# Patient Record
Sex: FEMALE | Race: WHITE | NOT HISPANIC OR LATINO | Employment: UNEMPLOYED | URBAN - METROPOLITAN AREA
[De-identification: names, ages, dates, MRNs, and addresses within clinical notes are randomized per-mention and may not be internally consistent; named-entity substitution may affect disease eponyms.]

---

## 2018-01-12 PROBLEM — Z80.0 FAMILY HISTORY OF COLON CANCER: Status: ACTIVE | Noted: 2018-01-12

## 2020-11-11 ENCOUNTER — OFFICE VISIT (OUTPATIENT)
Dept: NEUROLOGY | Facility: CLINIC | Age: 60
End: 2020-11-11
Payer: MEDICAID

## 2020-11-11 VITALS
HEART RATE: 76 BPM | DIASTOLIC BLOOD PRESSURE: 64 MMHG | BODY MASS INDEX: 29.12 KG/M2 | WEIGHT: 174.81 LBS | TEMPERATURE: 98 F | HEIGHT: 65 IN | SYSTOLIC BLOOD PRESSURE: 112 MMHG | RESPIRATION RATE: 16 BRPM

## 2020-11-11 DIAGNOSIS — G40.909 SEIZURE DISORDER: ICD-10-CM

## 2020-11-11 PROCEDURE — 99999 PR PBB SHADOW E&M-EST. PATIENT-LVL III: ICD-10-PCS | Mod: PBBFAC,,, | Performed by: PHYSICIAN ASSISTANT

## 2020-11-11 PROCEDURE — 99204 PR OFFICE/OUTPT VISIT, NEW, LEVL IV, 45-59 MIN: ICD-10-PCS | Mod: S$PBB,,, | Performed by: PHYSICIAN ASSISTANT

## 2020-11-11 PROCEDURE — 99204 OFFICE O/P NEW MOD 45 MIN: CPT | Mod: S$PBB,,, | Performed by: PHYSICIAN ASSISTANT

## 2020-11-11 PROCEDURE — 99213 OFFICE O/P EST LOW 20 MIN: CPT | Mod: PBBFAC | Performed by: PHYSICIAN ASSISTANT

## 2020-11-11 PROCEDURE — 99999 PR PBB SHADOW E&M-EST. PATIENT-LVL III: CPT | Mod: PBBFAC,,, | Performed by: PHYSICIAN ASSISTANT

## 2020-11-11 RX ORDER — LEVETIRACETAM 500 MG/1
TABLET, EXTENDED RELEASE ORAL
Qty: 120 TABLET | Refills: 11 | Status: SHIPPED | OUTPATIENT
Start: 2020-11-11 | End: 2021-10-27

## 2020-11-11 NOTE — PROGRESS NOTES
Subjective:       Patient ID: Ina Adair is a 60 y.o. female.    Chief Complaint: Seizures (6 month follow up)      HPI:  Ina Adair is a 60 y.o. female is here for follow up visit regarding epilepsy. Medications and tolerability were reviewed. Patient states compliance with seizure medications.  No seizures since last visit. She is doing very well with regard to epilepsy. She takes 3 gm of Keppra XR nightly with good tolerability and compliance. She has been seizure free for six years.   Overall, she is doing well. She has kept the weight off and she continues to be tobacco free. Mood is good as well.    She has been on Keppra  mg 4 tabs at bedtime for many years. She has very good compliance and seizure control. She previously had SE with BID dosing, but she is doing fine with XR once daily dosing.    Past Medical History:   Diagnosis Date    Epilepsy     GERD (gastroesophageal reflux disease)     HTN (hypertension)     Onychomycosis     Primary osteoarthritis of right hip 9/10/2015    STD (sexually transmitted disease) 2010    trichomonas    Stress incontinence, female     Tobacco abuse        Past Surgical History:   Procedure Laterality Date    APPENDECTOMY      COLONOSCOPY N/A 1/12/2018    Procedure: COLONOSCOPY;  Surgeon: Yanelis Handy MD;  [facility];  Service: Endoscopy;  Laterality: N/A;    DILATION AND CURETTAGE OF UTERUS      TONSILLECTOMY      [facility]10/2015    [facility]    TUBAL LIGATION         Family History   Problem Relation Age of Onset    Heart disease Mother     Cancer Mother         colon cancer    Hypoglycemic Father     Breast cancer Paternal Aunt     Osteoporosis Sister     Diabetes Brother        Social History     Socioeconomic History    Marital status: Single     Spouse name: Not on file    Number of children: 3    Years of education: GED    Highest education level: Not on file   Occupational  History    Not on file   Social Needs    Financial resource strain: Not on file    Food insecurity     Worry: Not on file     Inability: Not on file    Transportation needs     Medical: Not on file     Non-medical: Not on file   Tobacco Use    Smoking status: Former Smoker     Packs/day: 1.00     Years: 16.00     Pack years: 16.00     Quit date: 1/10/2017     Years since quitting: 3.8    Smokeless tobacco: Never Used   Substance and Sexual Activity    Alcohol use: Yes     Alcohol/week: 1.0 standard drinks     Types: 1 Cans of beer per week     Comment: weekly    Drug use: No    Sexual activity: Yes     Partners: Male     Birth control/protection: Surgical, Post-menopausal, See Surgical Hx   Lifestyle    Physical activity     Days per week: Not on file     Minutes per session: Not on file    Stress: Not on file   Relationships    Social connections     Talks on phone: Not on file     Gets together: Not on file     Attends [employer] service: Not on file     Active member of club or organization: Not on file     Attends meetings of clubs or organizations: Not on file     Relationship status: Not on file   Other Topics Concern    Not on file   Social History Narrative    Not on file       Current Outpatient Medications   Medication Sig Dispense Refill    ARIPiprazole (ABILIFY) 5 MG Tab Take 5 mg by mouth once daily.  0    levetiracetam XR (KEPPRA XR) 500 mg Tb24 24 hr tablet 4 tablets at night for seizures. 120 tablet 11    multivit-minerals-ferrous fum (MULTI VITAMIN) 9 mg iron/15 mL Liqd Multi Vitamin   1 po daily      oxybutynin (DITROPAN-XL) 5 MG TR24 TAKE 1 TABLET BY MOUTH EVERY DAY 30 tablet 11    sertraline (ZOLOFT) 100 MG tablet Take 1 tablet (100 mg total) by mouth once daily. 30 tablet 11              0     No current facility-administered medications for this visit.        Review of patient's allergies indicates:   Allergen Reactions    Ace inhibitors      cough    Amoxicillin Nausea  And Vomiting     diarrhea    Achromycin Rash           Review of Systems   Constitutional: Positive for activity change (increased activity). Negative for appetite change and fever.   HENT: Negative for sore throat.    Eyes: Negative for visual disturbance.   Respiratory: Negative for cough and shortness of breath.    Cardiovascular: Negative for chest pain.   Gastrointestinal: Negative for nausea and vomiting.   Endocrine: Negative for cold intolerance and heat intolerance.   Genitourinary: Negative for difficulty urinating.   Musculoskeletal: Positive for arthralgias (improved) and neck pain (improved). Negative for back pain.   Skin: Negative for rash.   Allergic/Immunologic: Negative for food allergies.   Neurological: Positive for seizures (well controlled). Negative for dizziness, tremors, speech difficulty, weakness, numbness and headaches.   Hematological: Does not bruise/bleed easily.   Psychiatric/Behavioral: Positive for dysphoric mood (improved). Negative for agitation, decreased concentration and sleep disturbance.       Objective:      Neurologic Exam     Cranial Nerves     CN III, IV, VI   Pupils are equal, round, and reactive to light.  Extraocular motions are normal.     Physical Exam  Vitals signs and nursing note reviewed.   Constitutional:       General: She is not in acute distress.     Appearance: She is well-developed. She is not diaphoretic.   HENT:      Head: Normocephalic and atraumatic.   Eyes:      General:         Right eye: No discharge.         Left eye: No discharge.      Extraocular Movements: EOM normal.      Conjunctiva/sclera: Conjunctivae normal.      Pupils: Pupils are equal, round, and reactive to light.   Neck:      Musculoskeletal: Normal range of motion and neck supple.      Thyroid: No thyromegaly.   Cardiovascular:      Rate and Rhythm: Normal rate and regular rhythm.   Pulmonary:      Effort: Pulmonary effort is normal.      Breath sounds: Normal breath sounds.    Abdominal:      General: Bowel sounds are normal.      Palpations: Abdomen is soft.   Musculoskeletal:         General: No tenderness.   Skin:     General: Skin is warm and dry.   Neurological:      Cranial Nerves: No cranial nerve deficit.      Motor: No abnormal muscle tone.      Coordination: Coordination normal.      Deep Tendon Reflexes: Reflexes are normal and symmetric.   Psychiatric:         Behavior: Behavior normal.         Thought Content: Thought content normal.         Judgment: Judgment normal.         Assessment:       1. Seizure disorder        Plan:   Discussed risks and benefits of potential treatment options as well as potential side effects of medications.  Patient was counseled to continue current medications. Seizure precautions were discussed. Specifically discussed driving restrictions per [place] law. Medication compliance discussed. Instructed to call clinic if concerned or to ED if emergency.    Seizure disorder  -     Levetiracetam level; Future; Expected date: 10/14/2019  She will continue Keppra since she has been seizure free for years. If level adequate, no dose changes or timing of dose changes.      MEJIA Fontenot

## 2021-05-06 ENCOUNTER — PATIENT MESSAGE (OUTPATIENT)
Dept: RESEARCH | Facility: HOSPITAL | Age: 61
End: 2021-05-06

## 2021-05-19 DIAGNOSIS — R06.09 DOE (DYSPNEA ON EXERTION): Primary | ICD-10-CM

## 2021-05-19 DIAGNOSIS — Z11.59 SPECIAL SCREENING EXAMINATION FOR UNSPECIFIED VIRAL DISEASE: ICD-10-CM

## 2021-05-23 ENCOUNTER — HOSPITAL ENCOUNTER (OUTPATIENT)
Dept: PREADMISSION TESTING | Facility: HOSPITAL | Age: 61
Discharge: HOME | End: 2021-05-23
Attending: INTERNAL MEDICINE
Payer: MEDICAID

## 2021-05-23 DIAGNOSIS — Z11.59 SPECIAL SCREENING EXAMINATION FOR UNSPECIFIED VIRAL DISEASE: ICD-10-CM

## 2021-05-23 LAB — SARS-COV-2 RNA RESP QL NAA+PROBE: NOT DETECTED

## 2021-05-23 PROCEDURE — U0003 INFECTIOUS AGENT DETECTION BY NUCLEIC ACID (DNA OR RNA); SEVERE ACUTE RESPIRATORY SYNDROME CORONAVIRUS 2 (SARS-COV-2) (CORONAVIRUS DISEASE [COVID-19]), AMPLIFIED PROBE TECHNIQUE, MAKING USE OF HIGH THROUGHPUT TECHNOLOGIES AS DESCRIBED BY CMS-2020-01-R: HCPCS | Performed by: INTERNAL MEDICINE

## 2021-05-23 PROCEDURE — U0005 INFEC AGEN DETEC AMPLI PROBE: HCPCS | Performed by: INTERNAL MEDICINE

## 2021-05-26 ENCOUNTER — HOSPITAL ENCOUNTER (OUTPATIENT)
Dept: PULMONOLOGY | Facility: HOSPITAL | Age: 61
Discharge: HOME | End: 2021-05-26
Attending: INTERNAL MEDICINE
Payer: MEDICAID

## 2021-05-26 DIAGNOSIS — R06.09 DOE (DYSPNEA ON EXERTION): ICD-10-CM

## 2021-05-26 PROCEDURE — 94799 UNLISTED PULMONARY SVC/PX: CPT | Mod: 26,,, | Performed by: INTERNAL MEDICINE

## 2021-05-26 PROCEDURE — 94060 EVALUATION OF WHEEZING: CPT

## 2021-05-26 PROCEDURE — 94727 GAS DIL/WSHOT DETER LNG VOL: CPT

## 2021-05-26 PROCEDURE — 94729 DIFFUSING CAPACITY: CPT

## 2021-05-26 PROCEDURE — 99900031 HC PATIENT EDUCATION (STAT)

## 2021-05-26 PROCEDURE — 94799 COMPL PULMO FUNCTION W/BR: ICD-10-PCS | Mod: 26,,, | Performed by: INTERNAL MEDICINE

## 2021-05-31 LAB
BRPFT: ABNORMAL
DLCO ADJ PRE: 16.1 ML/(MIN*MMHG) (ref 17.78–29.25)
DLCO SINGLE BREATH LLN: 17.78
DLCO SINGLE BREATH PRE REF: 68.5 %
DLCO SINGLE BREATH REF: 23.51
DLCOC SBVA LLN: 3.17
DLCOC SBVA PRE REF: 79.4 %
DLCOC SBVA REF: 4.6
DLCOC SINGLE BREATH LLN: 17.78
DLCOC SINGLE BREATH PRE REF: 68.5 %
DLCOC SINGLE BREATH REF: 23.51
DLCOVA LLN: 3.17
DLCOVA PRE REF: 79.4 %
DLCOVA PRE: 3.66 ML/(MIN*MMHG*L) (ref 3.17–6.04)
DLCOVA REF: 4.6
DLVAADJ PRE: 3.66 ML/(MIN*MMHG*L) (ref 3.17–6.04)
ERVN2 LLN: -16449.18
ERVN2 PRE REF: 59.5 %
ERVN2 PRE: 0.49 L (ref -16449.18–16450.82)
ERVN2 REF: 0.82
FEF 25 75 CHG: -2.7 %
FEF 25 75 LLN: 1.21
FEF 25 75 POST REF: 57.8 %
FEF 25 75 PRE REF: 59.4 %
FEF 25 75 REF: 2.35
FET100 CHG: 1.1 %
FEV1 CHG: 1.8 %
FEV1 FVC CHG: -0.7 %
FEV1 FVC LLN: 67
FEV1 FVC POST REF: 90.8 %
FEV1 FVC PRE REF: 91.4 %
FEV1 FVC REF: 79
FEV1 LLN: 1.97
FEV1 POST REF: 77.7 %
FEV1 PRE REF: 76.4 %
FEV1 REF: 2.6
FRCN2 LLN: 1.93
FRCN2 PRE REF: 89.7 %
FRCN2 REF: 2.76
FVC CHG: 2.5 %
FVC LLN: 2.51
FVC POST REF: 85 %
FVC PRE REF: 83 %
FVC REF: 3.3
IVC PRE: 2.63 L (ref 2.51–4.13)
IVC SINGLE BREATH LLN: 2.51
IVC SINGLE BREATH PRE REF: 79.6 %
IVC SINGLE BREATH REF: 3.3
MEP LLN: 63
MEP PRE REF: 95.9 %
MEP PRE: 76.76 CMH2O (ref 63.23–96.78)
MEP REF: 80
MIP LLN: 33
MIP PRE REF: 131.4 %
MIP PRE: 65.7 CMH2O (ref 33.23–66.78)
MIP REF: 50
MVV LLN: 83
MVV PRE REF: 71.2 %
MVV REF: 98
PEF CHG: -0.1 %
PEF LLN: 4.74
PEF POST REF: 82.8 %
PEF PRE REF: 82.8 %
PEF REF: 6.5
POST FEF 25 75: 1.36 L/S (ref 1.21–3.88)
POST FET 100: 6.57 SEC
POST FEV1 FVC: 71.99 % (ref 67.36–89.44)
POST FEV1: 2.02 L (ref 1.97–3.21)
POST FVC: 2.81 L (ref 2.51–4.13)
POST PEF: 5.38 L/S (ref 4.74–8.27)
PRE DLCO: 16.1 ML/(MIN*MMHG) (ref 17.78–29.25)
PRE FEF 25 75: 1.4 L/S (ref 1.21–3.88)
PRE FET 100: 6.51 SEC
PRE FEV1 FVC: 72.5 % (ref 67.36–89.44)
PRE FEV1: 1.99 L (ref 1.97–3.21)
PRE FRC N2: 2.47 L (ref 1.93–3.58)
PRE FVC: 2.74 L (ref 2.51–4.13)
PRE MVV: 69.66 L/MIN (ref 83.18–112.54)
PRE PEF: 5.39 L/S (ref 4.74–8.27)
RVN2 LLN: 1.36
RVN2 PRE REF: 102.6 %
RVN2 PRE: 1.98 L (ref 1.36–2.51)
RVN2 REF: 1.93
RVN2TLCN2 LLN: 29.43
RVN2TLCN2 PRE REF: 104.1 %
RVN2TLCN2 PRE: 40.64 % (ref 29.43–48.61)
RVN2TLCN2 REF: 39.02
TLCN2 LLN: 4.12
TLCN2 PRE REF: 95.5 %
TLCN2 PRE: 4.88 L (ref 4.12–6.09)
TLCN2 REF: 5.11
VA PRE: 4.4 L (ref 4.96–4.96)
VA SINGLE BREATH LLN: 4.96
VA SINGLE BREATH PRE REF: 88.8 %
VA SINGLE BREATH REF: 4.96
VCMAXN2 LLN: 2.51
VCMAXN2 PRE REF: 87.7 %
VCMAXN2 PRE: 2.9 L (ref 2.51–4.13)
VCMAXN2 REF: 3.3

## 2022-03-10 DIAGNOSIS — G40.909 SEIZURE DISORDER: ICD-10-CM

## 2022-03-10 RX ORDER — LEVETIRACETAM 500 MG/1
TABLET, EXTENDED RELEASE ORAL
Qty: 120 TABLET | Refills: 1 | Status: CANCELLED | OUTPATIENT
Start: 2022-03-10

## 2022-03-10 NOTE — TELEPHONE ENCOUNTER
----- Message from Albert Figueroa sent at 3/10/2022  8:24 AM CST -----  Contact: self  Ina Adair  MRN: [id number]  : 1960  PCP: Harpreet Harley  Home Phone      [phone]  Work Phone      Not on file.  Mobile          [phone]      MESSAGE: Patient is stating she is out of seizure meds and she is in a panic. Please return [phone]  Pharmacy: [facility]

## 2022-03-10 NOTE — TELEPHONE ENCOUNTER
Patient was informed this will be the last refill until appointment visit that is scheduled for 03/14/22.

## 2022-03-14 ENCOUNTER — OFFICE VISIT (OUTPATIENT)
Dept: NEUROLOGY | Facility: CLINIC | Age: 62
End: 2022-03-14
Payer: MEDICAID

## 2022-03-14 VITALS
DIASTOLIC BLOOD PRESSURE: 86 MMHG | HEIGHT: 65 IN | SYSTOLIC BLOOD PRESSURE: 134 MMHG | WEIGHT: 208.31 LBS | RESPIRATION RATE: 16 BRPM | BODY MASS INDEX: 34.71 KG/M2 | HEART RATE: 73 BPM

## 2022-03-14 DIAGNOSIS — G40.909 SEIZURE DISORDER: ICD-10-CM

## 2022-03-14 PROCEDURE — 3075F SYST BP GE 130 - 139MM HG: CPT | Mod: CPTII,,, | Performed by: PHYSICIAN ASSISTANT

## 2022-03-14 PROCEDURE — 1159F PR MEDICATION LIST DOCUMENTED IN MEDICAL RECORD: ICD-10-PCS | Mod: CPTII,,, | Performed by: PHYSICIAN ASSISTANT

## 2022-03-14 PROCEDURE — 3075F PR MOST RECENT SYSTOLIC BLOOD PRESS GE 130-139MM HG: ICD-10-PCS | Mod: CPTII,,, | Performed by: PHYSICIAN ASSISTANT

## 2022-03-14 PROCEDURE — 99214 PR OFFICE/OUTPT VISIT, EST, LEVL IV, 30-39 MIN: ICD-10-PCS | Mod: S$PBB,,, | Performed by: PHYSICIAN ASSISTANT

## 2022-03-14 PROCEDURE — 3008F BODY MASS INDEX DOCD: CPT | Mod: CPTII,,, | Performed by: PHYSICIAN ASSISTANT

## 2022-03-14 PROCEDURE — 1159F MED LIST DOCD IN RCRD: CPT | Mod: CPTII,,, | Performed by: PHYSICIAN ASSISTANT

## 2022-03-14 PROCEDURE — 99999 PR PBB SHADOW E&M-EST. PATIENT-LVL III: ICD-10-PCS | Mod: PBBFAC,,, | Performed by: PHYSICIAN ASSISTANT

## 2022-03-14 PROCEDURE — 3079F DIAST BP 80-89 MM HG: CPT | Mod: CPTII,,, | Performed by: PHYSICIAN ASSISTANT

## 2022-03-14 PROCEDURE — 99999 PR PBB SHADOW E&M-EST. PATIENT-LVL III: CPT | Mod: PBBFAC,,, | Performed by: PHYSICIAN ASSISTANT

## 2022-03-14 PROCEDURE — 3079F PR MOST RECENT DIASTOLIC BLOOD PRESSURE 80-89 MM HG: ICD-10-PCS | Mod: CPTII,,, | Performed by: PHYSICIAN ASSISTANT

## 2022-03-14 PROCEDURE — 1160F RVW MEDS BY RX/DR IN RCRD: CPT | Mod: CPTII,,, | Performed by: PHYSICIAN ASSISTANT

## 2022-03-14 PROCEDURE — 99213 OFFICE O/P EST LOW 20 MIN: CPT | Mod: PBBFAC | Performed by: PHYSICIAN ASSISTANT

## 2022-03-14 PROCEDURE — 99214 OFFICE O/P EST MOD 30 MIN: CPT | Mod: S$PBB,,, | Performed by: PHYSICIAN ASSISTANT

## 2022-03-14 PROCEDURE — 3008F PR BODY MASS INDEX (BMI) DOCUMENTED: ICD-10-PCS | Mod: CPTII,,, | Performed by: PHYSICIAN ASSISTANT

## 2022-03-14 PROCEDURE — 1160F PR REVIEW ALL MEDS BY PRESCRIBER/CLIN PHARMACIST DOCUMENTED: ICD-10-PCS | Mod: CPTII,,, | Performed by: PHYSICIAN ASSISTANT

## 2022-03-14 RX ORDER — LEVETIRACETAM 500 MG/1
TABLET, EXTENDED RELEASE ORAL
Qty: 120 TABLET | Refills: 3 | Status: SHIPPED | OUTPATIENT
Start: 2022-03-14 | End: 2022-05-09 | Stop reason: SDUPTHER

## 2022-03-14 NOTE — PROGRESS NOTES
Subjective:       Patient ID: Ina Adair is a 62 y.o. female.    Chief Complaint: Neurologic Problem (Follow up)      HPI:  Ina Adair is a 62 y.o. female is here for follow up visit regarding epilepsy. Medications and tolerability were reviewed. Patient states compliance with seizure medications.  No seizures since last visit. She is doing very well with regard to epilepsy. She takes 3 gm of Keppra XR nightly with good tolerability and compliance. She has very well controlled seizures on Keppra. She missed a dose a few months ago and had a focal seizure. She has been compliant, for the most part.     Keppra BID not tolerated, but she has no SE of the Keppra XR nightly.    Other than arthralgias in neck, knees and hips, she feels generally well. She has no other complaints today.    .     Past Medical History:   Diagnosis Date    Epilepsy     GERD (gastroesophageal reflux disease)     HTN (hypertension)     Onychomycosis     Primary osteoarthritis of right hip 9/10/2015    STD (sexually transmitted disease) 2010    trichomonas    Stress incontinence, female     Tobacco abuse        Past Surgical History:   Procedure Laterality Date    APPENDECTOMY      COLONOSCOPY N/A 1/12/2018    Procedure: COLONOSCOPY;  Surgeon: Yanelis Handy MD;  Location: [facility];  Service: Endoscopy;  Laterality: N/A;    DILATION AND CURETTAGE OF UTERUS      TONSILLECTOMY      TOTAL HIP [facility]ROPLASTY [facility] 09/10/2015    at [facility]    TUBAL LIGATION         Family History   Problem Relation Age of Onset    Heart disease Mother     Cancer Mother         colon cancer    Hypoglycemic Father     Breast cancer Paternal Aunt     Osteoporosis Sister     Diabetes Brother        Social History     Socioeconomic History    Marital status: Single    Number of children: 3    Years of education: GED   Tobacco Use    Smoking status: Former Smoker     Packs/day: 1.00     Years: 16.00     Pack years: 16.00      Quit date: 1/10/2017     Years since quittin.1    Smokeless tobacco: Never Used   Substance and Sexual Activity    Alcohol use: Yes     Alcohol/week: 1.0 standard drink     Types: 1 Cans of beer per week     Comment: weekly    Drug use: No    Sexual activity: Yes     Partners: Male     Birth control/protection: Surgical, Post-menopausal, See Surgical Hx       Current Outpatient Medications   Medication Sig Dispense Refill    ARIPiprazole (ABILIFY) 5 MG Tab Take 5 mg by mouth once daily.  0    levetiracetam XR (KEPPRA XR) 500 mg Tb24 24 hr tablet 4 tablets at night for seizures. 120 tablet 11    multivit-minerals-ferrous fum (MULTI VITAMIN) 9 mg iron/15 mL Liqd Multi Vitamin   1 po daily      oxybutynin (DITROPAN-XL) 5 MG TR24 TAKE 1 TABLET BY MOUTH EVERY DAY 30 tablet 11    sertraline (ZOLOFT) 100 MG tablet Take 1 tablet (100 mg total) by mouth once daily. 30 tablet 11              0     No current facility-administered medications for this visit.        Review of patient's allergies indicates:   Allergen Reactions    Ace inhibitors      cough    Amoxicillin Nausea And Vomiting     diarrhea    Achromycin Rash           Review of Systems   Constitutional: Positive for activity change (increased activity). Negative for appetite change and fever.   HENT: Negative for sore throat.    Eyes: Negative for visual disturbance.   Respiratory: Negative for cough and shortness of breath.    Cardiovascular: Negative for chest pain.   Gastrointestinal: Negative for nausea and vomiting.   Endocrine: Negative for cold intolerance and heat intolerance.   Genitourinary: Negative for difficulty urinating.   Musculoskeletal: Positive for arthralgias (improved) and neck pain (improved). Negative for back pain.   Skin: Negative for rash.   Allergic/Immunologic: Negative for food allergies.   Neurological: Positive for seizures (one breakthrough with missed meds). Negative for dizziness, tremors, speech difficulty,  weakness, numbness and headaches.   Hematological: Does not bruise/bleed easily.   Psychiatric/Behavioral: Positive for dysphoric mood (improved). Negative for agitation, decreased concentration and sleep disturbance.       Objective:      Neurologic Exam     Cranial Nerves     CN III, IV, VI   Pupils are equal, round, and reactive to light.  Extraocular motions are normal.     Physical Exam  Vitals and nursing note reviewed.   Constitutional:       General: She is not in acute distress.     Appearance: She is well-developed. She is not diaphoretic.   HENT:      Head: Normocephalic and atraumatic.   Eyes:      General:         Right eye: No discharge.         Left eye: No discharge.      Extraocular Movements: EOM normal.      Conjunctiva/sclera: Conjunctivae normal.      Pupils: Pupils are equal, round, and reactive to light.   Neck:      Thyroid: No thyromegaly.   Cardiovascular:      Rate and Rhythm: Normal rate and regular rhythm.   Pulmonary:      Effort: Pulmonary effort is normal.      Breath sounds: Normal breath sounds.   Abdominal:      General: Bowel sounds are normal.      Palpations: Abdomen is soft.   Musculoskeletal:         General: No tenderness.      Cervical back: Normal range of motion and neck supple.   Skin:     General: Skin is warm and dry.   Neurological:      Cranial Nerves: No cranial nerve deficit.      Motor: No abnormal muscle tone.      Coordination: Coordination normal.      Deep Tendon Reflexes: Reflexes are normal and symmetric.   Psychiatric:         Behavior: Behavior normal.         Thought Content: Thought content normal.         Judgment: Judgment normal.         Assessment:       1. Seizure disorder        Plan:   Discussed risks and benefits of potential treatment options as well as potential side effects of medications.  Patient was counseled to continue current medications. Seizure precautions were discussed. Specifically discussed driving restrictions per [place] law.  Medication compliance discussed. Instructed to call clinic if concerned or to ED if emergency.    Seizure disorder  She will continue Keppra XR 3 gm nightly.  She has done very well on monotherapy  One focal seizure due to missed dose  Discussed compliance and 'catch up' doses if missed.      MEJIA Fontenot

## 2022-05-09 DIAGNOSIS — G40.909 SEIZURE DISORDER: ICD-10-CM

## 2022-05-09 RX ORDER — LEVETIRACETAM 500 MG/1
TABLET, EXTENDED RELEASE ORAL
Qty: 120 TABLET | Refills: 3 | Status: SHIPPED | OUTPATIENT
Start: 2022-05-09 | End: 2023-01-12 | Stop reason: SDUPTHER

## 2022-08-25 ENCOUNTER — HOSPITAL ENCOUNTER (OUTPATIENT)
Dept: RADIOLOGY | Facility: HOSPITAL | Age: 62
Discharge: HOME | End: 2022-08-25
Attending: FAMILY MEDICINE
Payer: MEDICAID

## 2022-08-25 DIAGNOSIS — M54.2 CERVICALGIA: ICD-10-CM

## 2022-08-25 DIAGNOSIS — R33.9 RETENTION OF URINE, UNSPECIFIED: ICD-10-CM

## 2022-08-25 DIAGNOSIS — M54.2 NECK PAIN: ICD-10-CM

## 2022-08-25 PROCEDURE — 72040 XR CERVICAL SPINE 2 OR 3 VIEWS: ICD-10-PCS | Mod: 26,,, | Performed by: RADIOLOGY

## 2022-08-25 PROCEDURE — 72070 X-RAY EXAM THORAC SPINE 2VWS: CPT | Mod: 26,,, | Performed by: RADIOLOGY

## 2022-08-25 PROCEDURE — 72070 X-RAY EXAM THORAC SPINE 2VWS: CPT | Mod: TC

## 2022-08-25 PROCEDURE — 72070 XR THORACIC SPINE AP LATERAL: ICD-10-PCS | Mod: 26,,, | Performed by: RADIOLOGY

## 2022-08-25 PROCEDURE — 72040 X-RAY EXAM NECK SPINE 2-3 VW: CPT | Mod: TC

## 2022-08-25 PROCEDURE — 76857 US BLADDER: ICD-10-PCS | Mod: 26,,, | Performed by: RADIOLOGY

## 2022-08-25 PROCEDURE — 76857 US EXAM PELVIC LIMITED: CPT | Mod: TC

## 2022-08-25 PROCEDURE — 72040 X-RAY EXAM NECK SPINE 2-3 VW: CPT | Mod: 26,,, | Performed by: RADIOLOGY

## 2022-08-25 PROCEDURE — 76857 US EXAM PELVIC LIMITED: CPT | Mod: 26,,, | Performed by: RADIOLOGY

## 2022-09-02 DIAGNOSIS — Z20.822 ENCOUNTER FOR LABORATORY TESTING FOR COVID-19 VIRUS: ICD-10-CM

## 2022-09-02 DIAGNOSIS — R06.09 DOE (DYSPNEA ON EXERTION): Primary | ICD-10-CM

## 2022-09-05 ENCOUNTER — HOSPITAL ENCOUNTER (OUTPATIENT)
Dept: PREADMISSION TESTING | Facility: HOSPITAL | Age: 62
Discharge: HOME | End: 2022-09-05
Attending: INTERNAL MEDICINE
Payer: MEDICAID

## 2022-09-05 DIAGNOSIS — Z20.822 ENCOUNTER FOR LABORATORY TESTING FOR COVID-19 VIRUS: ICD-10-CM

## 2022-09-05 LAB — SARS-COV-2 RNA RESP QL NAA+PROBE: NOT DETECTED

## 2022-09-05 PROCEDURE — U0003 INFECTIOUS AGENT DETECTION BY NUCLEIC ACID (DNA OR RNA); SEVERE ACUTE RESPIRATORY SYNDROME CORONAVIRUS 2 (SARS-COV-2) (CORONAVIRUS DISEASE [COVID-19]), AMPLIFIED PROBE TECHNIQUE, MAKING USE OF HIGH THROUGHPUT TECHNOLOGIES AS DESCRIBED BY CMS-2020-01-R: HCPCS | Performed by: INTERNAL MEDICINE

## 2022-09-05 PROCEDURE — U0005 INFEC AGEN DETEC AMPLI PROBE: HCPCS | Performed by: INTERNAL MEDICINE

## 2022-09-08 ENCOUNTER — HOSPITAL ENCOUNTER (OUTPATIENT)
Dept: PULMONOLOGY | Facility: HOSPITAL | Age: 62
Discharge: HOME | End: 2022-09-08
Attending: INTERNAL MEDICINE
Payer: MEDICAID

## 2022-09-08 DIAGNOSIS — R06.09 DOE (DYSPNEA ON EXERTION): ICD-10-CM

## 2022-09-08 LAB
BRPFT: ABNORMAL
DLCO SINGLE BREATH LLN: 17.63
DLCO SINGLE BREATH PRE REF: 62.3 %
DLCO SINGLE BREATH REF: 23.37
DLCOC SBVA LLN: 3.14
DLCOC SBVA REF: 4.58
DLCOC SINGLE BREATH LLN: 17.63
DLCOC SINGLE BREATH REF: 23.37
DLCOVA LLN: 3.14
DLCOVA PRE REF: 75.1 %
DLCOVA PRE: 3.44 ML/(MIN*MMHG*L) (ref 3.14–6.01)
DLCOVA REF: 4.58
ERVN2 LLN: -16449.19
ERVN2 PRE REF: 52.4 %
ERVN2 PRE: 0.42 L (ref -16449.19–16450.81)
ERVN2 REF: 0.81
FEF 25 75 CHG: 17.1 %
FEF 25 75 LLN: 1.17
FEF 25 75 POST REF: 54.6 %
FEF 25 75 PRE REF: 46.6 %
FEF 25 75 REF: 2.3
FET100 CHG: -4.6 %
FEV1 CHG: 12.8 %
FEV1 FVC CHG: 2.1 %
FEV1 FVC LLN: 67
FEV1 FVC POST REF: 87.6 %
FEV1 FVC PRE REF: 85.8 %
FEV1 FVC REF: 79
FEV1 LLN: 1.93
FEV1 POST REF: 75.5 %
FEV1 PRE REF: 66.9 %
FEV1 REF: 2.56
FRCN2 LLN: 1.94
FRCN2 PRE REF: 92.1 %
FRCN2 REF: 2.76
FVC CHG: 10.5 %
FVC LLN: 2.47
FVC POST REF: 85.6 %
FVC PRE REF: 77.4 %
FVC REF: 3.26
IVC PRE: 2.43 L (ref 2.47–4.09)
IVC SINGLE BREATH LLN: 2.47
IVC SINGLE BREATH PRE REF: 74.5 %
IVC SINGLE BREATH REF: 3.26
MEP LLN: 63
MEP PRE REF: 105.2 %
MEP PRE: 84.16 CMH2O (ref 63.23–96.78)
MEP REF: 80
MIP LLN: 33
MIP PRE REF: 140.9 %
MIP PRE: 70.44 CMH2O (ref 33.23–66.78)
MIP REF: 50
MVV LLN: 82
MVV PRE REF: 59.3 %
MVV REF: 97
PEF CHG: 0 %
PEF LLN: 4.68
PEF POST REF: 67.5 %
PEF PRE REF: 67.4 %
PEF REF: 6.45
POST FEF 25 75: 1.25 L/S (ref 1.17–3.81)
POST FET 100: 6.52 SEC
POST FEV1 FVC: 69.33 % (ref 67.07–89.43)
POST FEV1: 1.93 L (ref 1.93–3.17)
POST FVC: 2.79 L (ref 2.47–4.09)
POST PEF: 4.35 L/S (ref 4.68–8.21)
PRE DLCO: 14.55 ML/(MIN*MMHG) (ref 17.63–29.1)
PRE FEF 25 75: 1.07 L/S (ref 1.17–3.81)
PRE FET 100: 6.83 SEC
PRE FEV1 FVC: 67.92 % (ref 67.07–89.43)
PRE FEV1: 1.72 L (ref 1.93–3.17)
PRE FRC N2: 2.54 L (ref 1.94–3.58)
PRE FVC: 2.52 L (ref 2.47–4.09)
PRE MVV: 57.42 L/MIN (ref 82.33–111.39)
PRE PEF: 4.35 L/S (ref 4.68–8.21)
RVN2 LLN: 1.37
RVN2 PRE REF: 108.6 %
RVN2 PRE: 2.12 L (ref 1.37–2.52)
RVN2 REF: 1.95
RVN2TLCN2 LLN: 29.77
RVN2TLCN2 PRE REF: 109.7 %
RVN2TLCN2 PRE: 43.18 % (ref 29.77–48.95)
RVN2TLCN2 REF: 39.36
TLCN2 LLN: 4.12
TLCN2 PRE REF: 95.9 %
TLCN2 PRE: 4.9 L (ref 4.12–6.09)
TLCN2 REF: 5.11
VA PRE: 4.23 L (ref 4.96–4.96)
VA SINGLE BREATH LLN: 4.96
VA SINGLE BREATH PRE REF: 85.4 %
VA SINGLE BREATH REF: 4.96
VCMAXN2 LLN: 2.47
VCMAXN2 PRE REF: 85.4 %
VCMAXN2 PRE: 2.78 L (ref 2.47–4.09)
VCMAXN2 REF: 3.26

## 2022-09-08 PROCEDURE — 94729 PR C02/MEMBANE DIFFUSE CAPACITY: ICD-10-PCS | Mod: 26,,, | Performed by: INTERNAL MEDICINE

## 2022-09-08 PROCEDURE — 94727 GAS DIL/WSHOT DETER LNG VOL: CPT | Mod: 26,,, | Performed by: INTERNAL MEDICINE

## 2022-09-08 PROCEDURE — 99900031 HC PATIENT EDUCATION (STAT)

## 2022-09-08 PROCEDURE — 94727 GAS DIL/WSHOT DETER LNG VOL: CPT

## 2022-09-08 PROCEDURE — 99900035 HC TECH TIME PER 15 MIN (STAT)

## 2022-09-08 PROCEDURE — 94799 UNLISTED PULMONARY SVC/PX: CPT | Mod: 26,,, | Performed by: INTERNAL MEDICINE

## 2022-09-08 PROCEDURE — 94727 PR PULM FUNCTION TEST BY GAS: ICD-10-PCS | Mod: 26,,, | Performed by: INTERNAL MEDICINE

## 2022-09-08 PROCEDURE — 94799 PR NIF/PIF PULMONARY FUNCTION TEST: ICD-10-PCS | Mod: 26,,, | Performed by: INTERNAL MEDICINE

## 2022-09-08 PROCEDURE — 94729 DIFFUSING CAPACITY: CPT

## 2022-09-08 PROCEDURE — 94060 EVALUATION OF WHEEZING: CPT

## 2022-09-08 PROCEDURE — 94060 PR EVAL OF BRONCHOSPASM: ICD-10-PCS | Mod: 26,,, | Performed by: INTERNAL MEDICINE

## 2022-09-08 PROCEDURE — 94729 DIFFUSING CAPACITY: CPT | Mod: 26,,, | Performed by: INTERNAL MEDICINE

## 2022-09-08 PROCEDURE — 94060 EVALUATION OF WHEEZING: CPT | Mod: 26,,, | Performed by: INTERNAL MEDICINE

## 2022-09-23 ENCOUNTER — HOSPITAL ENCOUNTER (OUTPATIENT)
Dept: RADIOLOGY | Facility: HOSPITAL | Age: 62
Discharge: HOME | End: 2022-09-23
Attending: FAMILY MEDICINE
Payer: MEDICAID

## 2022-09-23 DIAGNOSIS — M48.54XA COLLAPSED VERTEBRA, NOT ELSEWHERE CLASSIFIED, THORACIC REGION, INITIAL ENCOUNTER FOR FRACTURE: ICD-10-CM

## 2022-09-23 PROCEDURE — 72146 MRI CHEST SPINE W/O DYE: CPT | Mod: 26,,, | Performed by: RADIOLOGY

## 2022-09-23 PROCEDURE — 72146 MRI THORACIC SPINE WITHOUT CONTRAST: ICD-10-PCS | Mod: 26,,, | Performed by: RADIOLOGY

## 2022-09-23 PROCEDURE — 72146 MRI CHEST SPINE W/O DYE: CPT | Mod: TC

## 2023-01-12 DIAGNOSIS — G40.909 SEIZURE DISORDER: ICD-10-CM

## 2023-01-12 RX ORDER — LEVETIRACETAM 500 MG/1
TABLET, EXTENDED RELEASE ORAL
Qty: 120 TABLET | Refills: 1 | Status: SHIPPED | OUTPATIENT
Start: 2023-01-12 | End: 2023-03-15 | Stop reason: SDUPTHER

## 2023-01-12 NOTE — TELEPHONE ENCOUNTER
----- Message from Odilia Vazquez sent at 2023 10:17 AM CST -----  Contact: PATIENT  Ina Adair  MRN: [id number]  : 1960  PCP: Harpreet Harley  Home Phone      [phone]  Work Phone      Not on file.  Mobile          [phone]      MESSAGE: Patient needs a refill on Keppra  mg 4 daily sent to [facility].          Phone: [phone]

## 2023-03-15 ENCOUNTER — OFFICE VISIT (OUTPATIENT)
Dept: NEUROLOGY | Facility: CLINIC | Age: 63
End: 2023-03-15
Payer: MEDICAID

## 2023-03-15 VITALS
SYSTOLIC BLOOD PRESSURE: 106 MMHG | OXYGEN SATURATION: 98 % | HEIGHT: 65 IN | BODY MASS INDEX: 35.59 KG/M2 | WEIGHT: 213.63 LBS | HEART RATE: 72 BPM | DIASTOLIC BLOOD PRESSURE: 68 MMHG

## 2023-03-15 DIAGNOSIS — G40.909 SEIZURE DISORDER: ICD-10-CM

## 2023-03-15 PROCEDURE — 1159F MED LIST DOCD IN RCRD: CPT | Mod: CPTII,,, | Performed by: PHYSICIAN ASSISTANT

## 2023-03-15 PROCEDURE — 99213 OFFICE O/P EST LOW 20 MIN: CPT | Mod: PBBFAC | Performed by: PHYSICIAN ASSISTANT

## 2023-03-15 PROCEDURE — 3074F SYST BP LT 130 MM HG: CPT | Mod: CPTII,,, | Performed by: PHYSICIAN ASSISTANT

## 2023-03-15 PROCEDURE — 3008F PR BODY MASS INDEX (BMI) DOCUMENTED: ICD-10-PCS | Mod: CPTII,,, | Performed by: PHYSICIAN ASSISTANT

## 2023-03-15 PROCEDURE — 3008F BODY MASS INDEX DOCD: CPT | Mod: CPTII,,, | Performed by: PHYSICIAN ASSISTANT

## 2023-03-15 PROCEDURE — 3078F PR MOST RECENT DIASTOLIC BLOOD PRESSURE < 80 MM HG: ICD-10-PCS | Mod: CPTII,,, | Performed by: PHYSICIAN ASSISTANT

## 2023-03-15 PROCEDURE — 1160F RVW MEDS BY RX/DR IN RCRD: CPT | Mod: CPTII,,, | Performed by: PHYSICIAN ASSISTANT

## 2023-03-15 PROCEDURE — 99999 PR PBB SHADOW E&M-EST. PATIENT-LVL III: CPT | Mod: PBBFAC,,, | Performed by: PHYSICIAN ASSISTANT

## 2023-03-15 PROCEDURE — 99999 PR PBB SHADOW E&M-EST. PATIENT-LVL III: ICD-10-PCS | Mod: PBBFAC,,, | Performed by: PHYSICIAN ASSISTANT

## 2023-03-15 PROCEDURE — 3078F DIAST BP <80 MM HG: CPT | Mod: CPTII,,, | Performed by: PHYSICIAN ASSISTANT

## 2023-03-15 PROCEDURE — 1160F PR REVIEW ALL MEDS BY PRESCRIBER/CLIN PHARMACIST DOCUMENTED: ICD-10-PCS | Mod: CPTII,,, | Performed by: PHYSICIAN ASSISTANT

## 2023-03-15 PROCEDURE — 3074F PR MOST RECENT SYSTOLIC BLOOD PRESSURE < 130 MM HG: ICD-10-PCS | Mod: CPTII,,, | Performed by: PHYSICIAN ASSISTANT

## 2023-03-15 PROCEDURE — 99213 PR OFFICE/OUTPT VISIT, EST, LEVL III, 20-29 MIN: ICD-10-PCS | Mod: S$PBB,,, | Performed by: PHYSICIAN ASSISTANT

## 2023-03-15 PROCEDURE — 99213 OFFICE O/P EST LOW 20 MIN: CPT | Mod: S$PBB,,, | Performed by: PHYSICIAN ASSISTANT

## 2023-03-15 PROCEDURE — 1159F PR MEDICATION LIST DOCUMENTED IN MEDICAL RECORD: ICD-10-PCS | Mod: CPTII,,, | Performed by: PHYSICIAN ASSISTANT

## 2023-03-15 RX ORDER — IBUPROFEN 800 MG/1
TABLET ORAL EVERY 8 HOURS PRN
COMMUNITY

## 2023-03-15 RX ORDER — ROSUVASTATIN CALCIUM 20 MG/1
TABLET, COATED ORAL
COMMUNITY

## 2023-03-15 RX ORDER — METOPROLOL SUCCINATE 50 MG/1
TABLET, EXTENDED RELEASE ORAL
COMMUNITY

## 2023-03-15 RX ORDER — CLONIDINE HYDROCHLORIDE 0.1 MG/1
TABLET ORAL
COMMUNITY

## 2023-03-15 RX ORDER — TRAMADOL HYDROCHLORIDE 50 MG/1
TABLET ORAL EVERY 6 HOURS PRN
COMMUNITY

## 2023-03-15 RX ORDER — LEVETIRACETAM 500 MG/1
TABLET, EXTENDED RELEASE ORAL
Qty: 120 TABLET | Refills: 6 | Status: SHIPPED | OUTPATIENT
Start: 2023-03-15 | End: 2023-04-26

## 2023-03-15 NOTE — PROGRESS NOTES
Subjective:       Patient ID: Ina Adair is a 63 y.o. female.    Chief Complaint: Neurologic Problem (Follow up.)      HPI:  Ina Adair is a 63 y.o. female is here for follow up visit regarding epilepsy. Medications and tolerability were reviewed. Patient states compliance with seizure medications.  No seizures since last visit. She is doing very well with regard to epilepsy. She takes 3 gm of Keppra XR nightly with good tolerability and compliance. She has very well controlled seizures on Keppra. She missed a dose a few months ago and had a focal seizure. She has been compliant, for the most part.     Keppra BID not tolerated, but she has no SE of the Keppra XR nightly.    Other than arthralgias in neck, knees and hips, she feels generally well. She has no other complaints today.    .     Past Medical History:   Diagnosis Date    Epilepsy     GERD (gastroesophageal reflux disease)     HTN (hypertension)     Onychomycosis     Primary osteoarthritis of right hip 9/10/2015    STD (sexually transmitted disease) 2010    trichomonas    Stress incontinence, female     Tobacco abuse        Past Surgical History:   Procedure Laterality Date    APPENDECTOMY      COLONOSCOPY N/A 1/12/2018    Procedure: COLONOSCOPY;  Surgeon: Yanelis Handy MD;  Location: [facility];  Service: Endoscopy;  Laterality: N/A;    DILATION AND CURETTAGE OF UTERUS      TONSILLECTOMY      TOTAL HIP ARTHROPLASTY Right 09/10/2015    at [facility]    TUBAL LIGATION         Family History   Problem Relation Age of Onset    Heart disease Mother     Cancer Mother         colon cancer    Hypoglycemic Father     Osteoporosis Sister     Diabetes Brother     Breast cancer Paternal Aunt     Meniere's disease Other        Social History     Socioeconomic History    Marital status: Single    Number of children: 3    Years of education: GED   Tobacco Use    Smoking status: Former     Packs/day: 1.00     Years: 16.00     Pack years: 16.00      Types: Cigarettes     Quit date: 1/10/2017     Years since quittin.1    Smokeless tobacco: Never   Substance and Sexual Activity    Alcohol use: Yes     Alcohol/week: 1.0 standard drink     Types: 1 Cans of beer per week     Comment: weekly    Drug use: No    Sexual activity: Yes     Partners: Male     Birth control/protection: Surgical, Post-menopausal, See Surgical Hx       Current Outpatient Medications   Medication Sig Dispense Refill    ARIPiprazole (ABILIFY) 5 MG Tab Take 5 mg by mouth once daily.  0    levetiracetam XR (KEPPRA XR) 500 mg Tb24 24 hr tablet 4 tablets at night for seizures. 120 tablet 11    multivit-minerals-ferrous fum (MULTI VITAMIN) 9 mg iron/15 mL Liqd Multi Vitamin   1 po daily      oxybutynin (DITROPAN-XL) 5 MG TR24 TAKE 1 TABLET BY MOUTH EVERY DAY 30 tablet 11    sertraline (ZOLOFT) 100 MG tablet Take 1 tablet (100 mg total) by mouth once daily. 30 tablet 11              0     No current facility-administered medications for this visit.        Review of patient's allergies indicates:   Allergen Reactions    Ace inhibitors      cough    Amoxicillin Nausea And Vomiting     diarrhea    Achromycin Rash           Review of Systems   Constitutional:  Positive for activity change (increased activity). Negative for appetite change and fever.   HENT:  Negative for sore throat.    Eyes:  Negative for visual disturbance.   Respiratory:  Negative for cough and shortness of breath.    Cardiovascular:  Negative for chest pain.   Gastrointestinal:  Negative for nausea and vomiting.   Endocrine: Negative for cold intolerance and heat intolerance.   Genitourinary:  Negative for difficulty urinating.   Musculoskeletal:  Positive for arthralgias (improved) and neck pain (improved). Negative for back pain.   Skin:  Negative for rash.   Allergic/Immunologic: Negative for food allergies.   Neurological:  Positive for seizures (one breakthrough with missed meds). Negative for dizziness, tremors, speech  difficulty, weakness, numbness and headaches.   Hematological:  Does not bruise/bleed easily.   Psychiatric/Behavioral:  Positive for dysphoric mood (improved). Negative for agitation, decreased concentration and sleep disturbance.      Objective:      Neurologic Exam     Cranial Nerves     CN III, IV, VI   Pupils are equal, round, and reactive to light.  Extraocular motions are normal.   Physical Exam  Vitals and nursing note reviewed.   Constitutional:       General: She is not in acute distress.     Appearance: She is well-developed. She is not diaphoretic.   HENT:      Head: Normocephalic and atraumatic.   Eyes:      General:         Right eye: No discharge.         Left eye: No discharge.      Extraocular Movements: EOM normal.      Conjunctiva/sclera: Conjunctivae normal.      Pupils: Pupils are equal, round, and reactive to light.   Neck:      Thyroid: No thyromegaly.   Cardiovascular:      Rate and Rhythm: Normal rate and regular rhythm.   Pulmonary:      Effort: Pulmonary effort is normal.      Breath sounds: Normal breath sounds.   Abdominal:      General: Bowel sounds are normal.      Palpations: Abdomen is soft.   Musculoskeletal:         General: No tenderness.      Cervical back: Normal range of motion and neck supple.   Skin:     General: Skin is warm and dry.   Neurological:      Cranial Nerves: No cranial nerve deficit.      Motor: No abnormal muscle tone.      Coordination: Coordination normal.      Deep Tendon Reflexes: Reflexes are normal and symmetric.   Psychiatric:         Behavior: Behavior normal.         Thought Content: Thought content normal.         Judgment: Judgment normal.       Assessment:       1. Seizure disorder        Plan:   Discussed risks and benefits of potential treatment options as well as potential side effects of medications.  Patient was counseled to continue current medications. Seizure precautions were discussed. Specifically discussed driving restrictions per  [place] law. Medication compliance discussed. Instructed to call clinic if concerned or to ED if emergency.    Seizure disorder  She will continue Keppra XR 3 gm nightly.  She has done very well on monotherapy  Discussed compliance and 'catch up' doses if missed.      MEJIA Fontenot

## 2023-04-19 ENCOUNTER — TELEPHONE (OUTPATIENT)
Dept: NEUROLOGY | Facility: CLINIC | Age: 63
End: 2023-04-19
Payer: MEDICAID

## 2023-04-19 DIAGNOSIS — G40.909 SEIZURE DISORDER: ICD-10-CM

## 2023-04-19 DIAGNOSIS — G40.919 BREAKTHROUGH SEIZURE: Primary | ICD-10-CM

## 2023-04-19 NOTE — TELEPHONE ENCOUNTER
MEJIA Doyle  You 15 minutes ago (9:21 AM)       I will send order to [facility]. She should go in the evening prior to Keppra dose.      MEJIA Doyle  You 18 minutes ago (9:18 AM)       I would like to get a Keppra level. I do not feel MRI is needed with one breakthrough seizure at this time.          Patient aware and v/u

## 2023-04-19 NOTE — TELEPHONE ENCOUNTER
"levetiracetam XR (KEPPRA XR) 500 mg Tb24 24 hr tablet 120 tablet 6 3/15/2023  No   Sig: TAKE 4 TABLETS BY MOUTH ONCE DAILY IN THE EVENING FOR SEIZURE.   Sent to pharmacy as: levetiracetam XR (KEPPRA XR) 500 mg Tb24 24 hr tablet   Patient states she had a mal seizure yesterday. States she is taking her medication constantly and has not changed anything. Patient is concerned why she is having a seizure after so long and wonders if she should get a MRI "since it's been a while".     Please advise  "

## 2023-04-19 NOTE — TELEPHONE ENCOUNTER
----- Message from Odilia Vazquez sent at 2023  8:42 AM CDT -----  Contact: PATIENT  Ina Adair  MRN: [id number]  : 1960  PCP: Harpreet Harley  Home Phone      [phone]  Work Phone      Not on file.  Mobile          [phone]      MESSAGE: Patient states that she had a petit seizure and would like to discuss this because she has been taking her medication as directed and is concerned.        Phone: [phone]

## 2023-04-26 ENCOUNTER — TELEPHONE (OUTPATIENT)
Dept: NEUROLOGY | Facility: CLINIC | Age: 63
End: 2023-04-26
Payer: MEDICAID

## 2023-04-26 RX ORDER — LEVETIRACETAM 750 MG/1
TABLET, EXTENDED RELEASE ORAL
Qty: 120 EACH | Refills: 6 | Status: SHIPPED | OUTPATIENT
Start: 2023-04-26 | End: 2023-09-13 | Stop reason: SDUPTHER

## 2023-04-26 NOTE — TELEPHONE ENCOUNTER
Keppra level is 24 which is OK but room to increase. I can increase the dose to 3000 from 2000 mg. She can take 6 tablets of the 500 mg xr tablets until she starts 750 mg tablets. I can send rx for 750 mg and she will take 4 for a total of 3000 mg daily.

## 2023-04-26 NOTE — TELEPHONE ENCOUNTER
Patient notified, able to verbalize understanding.     Please send in Rx for 750 mg tablets to [facility] pharmacy.

## 2023-09-13 ENCOUNTER — OFFICE VISIT (OUTPATIENT)
Dept: NEUROLOGY | Facility: CLINIC | Age: 63
End: 2023-09-13
Payer: MEDICAID

## 2023-09-13 ENCOUNTER — HOSPITAL ENCOUNTER (OUTPATIENT)
Dept: RADIOLOGY | Facility: HOSPITAL | Age: 63
Discharge: HOME | End: 2023-09-13
Attending: PHYSICIAN ASSISTANT
Payer: MEDICAID

## 2023-09-13 VITALS
HEIGHT: 65 IN | SYSTOLIC BLOOD PRESSURE: 116 MMHG | BODY MASS INDEX: 35.48 KG/M2 | RESPIRATION RATE: 16 BRPM | OXYGEN SATURATION: 98 % | DIASTOLIC BLOOD PRESSURE: 60 MMHG | HEART RATE: 73 BPM | WEIGHT: 212.94 LBS

## 2023-09-13 DIAGNOSIS — M16.12 PRIMARY OSTEOARTHRITIS OF LEFT HIP: ICD-10-CM

## 2023-09-13 DIAGNOSIS — G40.909 SEIZURE DISORDER: Primary | ICD-10-CM

## 2023-09-13 PROCEDURE — 3078F DIAST BP <80 MM HG: CPT | Mod: CPTII,,, | Performed by: PHYSICIAN ASSISTANT

## 2023-09-13 PROCEDURE — 73502 X-RAY EXAM HIP UNI 2-3 VIEWS: CPT | Mod: TC,LT

## 2023-09-13 PROCEDURE — 99999 PR PBB SHADOW E&M-EST. PATIENT-LVL IV: CPT | Mod: PBBFAC,,, | Performed by: PHYSICIAN ASSISTANT

## 2023-09-13 PROCEDURE — 99214 OFFICE O/P EST MOD 30 MIN: CPT | Mod: PBBFAC | Performed by: PHYSICIAN ASSISTANT

## 2023-09-13 PROCEDURE — 73502 X-RAY EXAM HIP UNI 2-3 VIEWS: CPT | Mod: 26,LT,, | Performed by: RADIOLOGY

## 2023-09-13 PROCEDURE — 99999 PR PBB SHADOW E&M-EST. PATIENT-LVL IV: ICD-10-PCS | Mod: PBBFAC,,, | Performed by: PHYSICIAN ASSISTANT

## 2023-09-13 PROCEDURE — 1160F RVW MEDS BY RX/DR IN RCRD: CPT | Mod: CPTII,,, | Performed by: PHYSICIAN ASSISTANT

## 2023-09-13 PROCEDURE — 3074F SYST BP LT 130 MM HG: CPT | Mod: CPTII,,, | Performed by: PHYSICIAN ASSISTANT

## 2023-09-13 PROCEDURE — 3078F PR MOST RECENT DIASTOLIC BLOOD PRESSURE < 80 MM HG: ICD-10-PCS | Mod: CPTII,,, | Performed by: PHYSICIAN ASSISTANT

## 2023-09-13 PROCEDURE — 1159F PR MEDICATION LIST DOCUMENTED IN MEDICAL RECORD: ICD-10-PCS | Mod: CPTII,,, | Performed by: PHYSICIAN ASSISTANT

## 2023-09-13 PROCEDURE — 3008F BODY MASS INDEX DOCD: CPT | Mod: CPTII,,, | Performed by: PHYSICIAN ASSISTANT

## 2023-09-13 PROCEDURE — 99214 OFFICE O/P EST MOD 30 MIN: CPT | Mod: S$PBB,,, | Performed by: PHYSICIAN ASSISTANT

## 2023-09-13 PROCEDURE — 1159F MED LIST DOCD IN RCRD: CPT | Mod: CPTII,,, | Performed by: PHYSICIAN ASSISTANT

## 2023-09-13 PROCEDURE — 73502 XR HIP WITH PELVIS WHEN PERFORMED, 2 OR 3 VIEWS LEFT: ICD-10-PCS | Mod: 26,LT,, | Performed by: RADIOLOGY

## 2023-09-13 PROCEDURE — 99214 PR OFFICE/OUTPT VISIT, EST, LEVL IV, 30-39 MIN: ICD-10-PCS | Mod: S$PBB,,, | Performed by: PHYSICIAN ASSISTANT

## 2023-09-13 PROCEDURE — 1160F PR REVIEW ALL MEDS BY PRESCRIBER/CLIN PHARMACIST DOCUMENTED: ICD-10-PCS | Mod: CPTII,,, | Performed by: PHYSICIAN ASSISTANT

## 2023-09-13 PROCEDURE — 3008F PR BODY MASS INDEX (BMI) DOCUMENTED: ICD-10-PCS | Mod: CPTII,,, | Performed by: PHYSICIAN ASSISTANT

## 2023-09-13 PROCEDURE — 3074F PR MOST RECENT SYSTOLIC BLOOD PRESSURE < 130 MM HG: ICD-10-PCS | Mod: CPTII,,, | Performed by: PHYSICIAN ASSISTANT

## 2023-09-13 RX ORDER — VALSARTAN AND HYDROCHLOROTHIAZIDE 320; 12.5 MG/1; MG/1
1 TABLET, FILM COATED ORAL DAILY
COMMUNITY
Start: 2023-08-18

## 2023-09-13 RX ORDER — LEVETIRACETAM 750 MG/1
TABLET, EXTENDED RELEASE ORAL
Qty: 120 EACH | Refills: 6 | Status: SHIPPED | OUTPATIENT
Start: 2023-09-13 | End: 2024-03-18 | Stop reason: SDUPTHER

## 2023-09-13 NOTE — PROGRESS NOTES
Subjective:       Patient ID: Ina Adair is a 63 y.o. female.    Chief Complaint: Neurologic Problem (6 month follow up ) and Seizures      HPI:  Ina Adair is a 63 y.o. female is here for follow up visit regarding epilepsy. Medications and tolerability were reviewed. Patient states compliance with seizure medications.  No seizures since last visit. She is doing very well with regard to epilepsy. She takes 3 gm of Keppra XR nightly with good tolerability and mostly good compliance.  She has very well controlled seizures on Keppra when compliant.     She missed a dose last week, she took the missed dose at noon the next day. She did not take that evenings dose and did not catch up. She had several focal seizures the third day and day following, three total.      Keppra BID not tolerated, but she has no SE of the Keppra XR nightly.     She has history of right total knee arthroplasty 8 years ago. She has recently begun having left hip pain which is now daily. She has pain when first rising in AM and after prolonged sitting. She sometimes limps due to the pain. It is familiar to her. No recent x-rays. She had the right hip surgery at [facility]. She has done very well since.    .     Past Medical History:   Diagnosis Date    Epilepsy     GERD (gastroesophageal reflux disease)     HTN (hypertension)     Onychomycosis     Primary osteoarthritis of right hip 9/10/2015    STD (sexually transmitted disease) 2010    trichomonas    Stress incontinence, female     Tobacco abuse        Past Surgical History:   Procedure Laterality Date    APPENDECTOMY      COLONOSCOPY N/A 1/12/2018    Procedure: COLONOSCOPY;  Surgeon: Yanelis Handy MD;  Location: [facility];  Service: Endoscopy;  Laterality: N/A;    DILATION AND CURETTAGE OF UTERUS      TONSILLECTOMY      TOTAL HIP ARTHROPLASTY Right 09/10/2015    at [facility]    TUBAL LIGATION         Family History   Problem Relation Age of Onset    Heart disease Mother      Cancer Mother         colon cancer    Hypoglycemic Father     Osteoporosis Sister     Diabetes Brother     Breast cancer Paternal Aunt     Meniere's disease Other        Social History     Socioeconomic History    Marital status: Single    Number of children: 3    Years of education: GED   Tobacco Use    Smoking status: Former     Current packs/day: 0.00     Average packs/day: 1 pack/day for 16.0 years (16.0 ttl pk-yrs)     Types: Cigarettes     Start date: 1/10/2001     Quit date: 1/10/2017     Years since quittin.6    Smokeless tobacco: Never   Substance and Sexual Activity    Alcohol use: Yes     Alcohol/week: 1.0 standard drink of alcohol     Types: 1 Cans of beer per week     Comment: occasionally    Drug use: No    Sexual activity: Yes     Partners: Male     Birth control/protection: Surgical, Post-menopausal, See Surgical Hx       Current Outpatient Medications   Medication Sig Dispense Refill    ARIPiprazole (ABILIFY) 5 MG Tab Take 5 mg by mouth once daily.  0    levetiracetam XR (KEPPRA XR) 500 mg Tb24 24 hr tablet 4 tablets at night for seizures. 120 tablet 11    multivit-minerals-ferrous fum (MULTI VITAMIN) 9 mg iron/15 mL Liqd Multi Vitamin   1 po daily      oxybutynin (DITROPAN-XL) 5 MG TR24 TAKE 1 TABLET BY MOUTH EVERY DAY 30 tablet 11    sertraline (ZOLOFT) 100 MG tablet Take 1 tablet (100 mg total) by mouth once daily. 30 tablet 11              0     No current facility-administered medications for this visit.        Review of patient's allergies indicates:   Allergen Reactions    Ace inhibitors      cough    Amoxicillin Nausea And Vomiting     diarrhea    Achromycin Rash           Review of Systems   Constitutional:  Positive for activity change (increased activity). Negative for appetite change and fever.   HENT:  Negative for sore throat.    Eyes:  Negative for visual disturbance.   Respiratory:  Negative for cough and shortness of breath.    Cardiovascular:  Negative for chest pain.    Gastrointestinal:  Negative for nausea and vomiting.   Endocrine: Negative for cold intolerance and heat intolerance.   Genitourinary:  Negative for difficulty urinating.   Musculoskeletal:  Positive for arthralgias (improved) and neck pain (improved). Negative for back pain.   Skin:  Negative for rash.   Allergic/Immunologic: Negative for food allergies.   Neurological:  Positive for seizures (one breakthrough with missed meds). Negative for dizziness, tremors, speech difficulty, weakness, numbness and headaches.   Hematological:  Does not bruise/bleed easily.   Psychiatric/Behavioral:  Positive for dysphoric mood (improved). Negative for agitation, decreased concentration and sleep disturbance.        Objective:      Neurologic Exam     Cranial Nerves     CN III, IV, VI   Pupils are equal, round, and reactive to light.  Extraocular motions are normal.     Physical Exam  Vitals and nursing note reviewed.   Constitutional:       General: She is not in acute distress.     Appearance: She is well-developed. She is not diaphoretic.   HENT:      Head: Normocephalic and atraumatic.   Eyes:      General:         Right eye: No discharge.         Left eye: No discharge.      Extraocular Movements: EOM normal.      Conjunctiva/sclera: Conjunctivae normal.      Pupils: Pupils are equal, round, and reactive to light.   Neck:      Thyroid: No thyromegaly.   Cardiovascular:      Rate and Rhythm: Normal rate and regular rhythm.   Pulmonary:      Effort: Pulmonary effort is normal.      Breath sounds: Normal breath sounds.   Abdominal:      General: Bowel sounds are normal.      Palpations: Abdomen is soft.   Musculoskeletal:         General: No tenderness.      Cervical back: Normal range of motion and neck supple.   Skin:     General: Skin is warm and dry.   Neurological:      Cranial Nerves: No cranial nerve deficit.      Motor: No abnormal muscle tone.      Coordination: Coordination normal.      Deep Tendon Reflexes:  Reflexes are normal and symmetric.   Psychiatric:         Behavior: Behavior normal.         Thought Content: Thought content normal.         Judgment: Judgment normal.         Assessment:       1. Seizure disorder    2. Primary osteoarthritis of left hip        Plan:   Discussed risks and benefits of potential treatment options as well as potential side effects of medications.  Patient was counseled to continue current medications. Seizure precautions were discussed. Specifically discussed driving restrictions per [place] law. Medication compliance discussed. Instructed to call clinic if concerned or to ED if emergency.    Seizure disorder  She will continue Keppra XR 3 gm nightly.  She has done very well on monotherapy  Discussed compliance and 'catch up' doses if missed.  If breakthrough with out missed dose, I will add second AED.      Primary osteoarthritis left hip  X rays today  Ortho referral sent to [facility].    MEJIA Fontenot

## 2024-03-18 ENCOUNTER — OFFICE VISIT (OUTPATIENT)
Dept: NEUROLOGY | Facility: CLINIC | Age: 64
End: 2024-03-18
Payer: MEDICAID

## 2024-03-18 VITALS
RESPIRATION RATE: 20 BRPM | SYSTOLIC BLOOD PRESSURE: 140 MMHG | WEIGHT: 223.75 LBS | BODY MASS INDEX: 37.28 KG/M2 | OXYGEN SATURATION: 100 % | HEIGHT: 65 IN | DIASTOLIC BLOOD PRESSURE: 80 MMHG | HEART RATE: 67 BPM

## 2024-03-18 DIAGNOSIS — G40.909 SEIZURE DISORDER: ICD-10-CM

## 2024-03-18 PROCEDURE — 99213 OFFICE O/P EST LOW 20 MIN: CPT | Mod: PBBFAC | Performed by: PHYSICIAN ASSISTANT

## 2024-03-18 PROCEDURE — 99213 OFFICE O/P EST LOW 20 MIN: CPT | Mod: S$PBB,,, | Performed by: PHYSICIAN ASSISTANT

## 2024-03-18 PROCEDURE — 3079F DIAST BP 80-89 MM HG: CPT | Mod: CPTII,,, | Performed by: PHYSICIAN ASSISTANT

## 2024-03-18 PROCEDURE — 3008F BODY MASS INDEX DOCD: CPT | Mod: CPTII,,, | Performed by: PHYSICIAN ASSISTANT

## 2024-03-18 PROCEDURE — 3077F SYST BP >= 140 MM HG: CPT | Mod: CPTII,,, | Performed by: PHYSICIAN ASSISTANT

## 2024-03-18 PROCEDURE — 1160F RVW MEDS BY RX/DR IN RCRD: CPT | Mod: CPTII,,, | Performed by: PHYSICIAN ASSISTANT

## 2024-03-18 PROCEDURE — 1159F MED LIST DOCD IN RCRD: CPT | Mod: CPTII,,, | Performed by: PHYSICIAN ASSISTANT

## 2024-03-18 PROCEDURE — 99999 PR PBB SHADOW E&M-EST. PATIENT-LVL III: CPT | Mod: PBBFAC,,, | Performed by: PHYSICIAN ASSISTANT

## 2024-03-18 RX ORDER — LEVETIRACETAM 750 MG/1
TABLET, EXTENDED RELEASE ORAL
Qty: 120 EACH | Refills: 11 | Status: SHIPPED | OUTPATIENT
Start: 2024-03-18

## 2024-03-18 NOTE — PROGRESS NOTES
Subjective:       Patient ID: Ina Adair is a 64 y.o. female.    Chief Complaint: No chief complaint on file.      HPI:  Ina Adair is a 64 y.o. female is here for follow up visit regarding epilepsy. Medications and tolerability were reviewed. Patient states compliance with seizure medications.  No seizures since last visit. She is doing very well with regard to epilepsy. She takes 3 gm of Keppra XR nightly with good tolerability and mostly good compliance.  She has very well controlled seizures on Keppra when compliant.    Keppra BID not tolerated, but she has no SE of the Keppra XR nightly.     She has history of right total hip arthroplasty 8 years ago. She has some hip pain in the other hip, but this has improved. No recent x-rays. She had the right hip surgery at [facility]. She has done very well since.    HTN controlled. HDL controlled. She is followed by Dr. Harley in [facility].    She has no complaints today. She is doing well.    .     Past Medical History:   Diagnosis Date    Epilepsy     GERD (gastroesophageal reflux disease)     HTN (hypertension)     Onychomycosis     Primary osteoarthritis of right hip 9/10/2015    STD (sexually transmitted disease) 2010    trichomonas    Stress incontinence, female     Tobacco abuse        Past Surgical History:   Procedure Laterality Date    APPENDECTOMY      COLONOSCOPY N/A 1/12/2018    Procedure: COLONOSCOPY;  Surgeon: Yanelis Handy MD;  Location: [facility];  Service: Endoscopy;  Laterality: N/A;    DILATION AND CURETTAGE OF UTERUS      TONSILLECTOMY      TOTAL HIP ARTHROPLASTY Right 09/10/2015    at [facility]    TUBAL LIGATION         Family History   Problem Relation Age of Onset    Heart disease Mother     Cancer Mother         colon cancer    Hypoglycemic Father     Osteoporosis Sister     Diabetes Brother     Breast cancer Paternal Aunt     Meniere's disease Other        Social History     Socioeconomic History    Marital status: Single     Number of children: 3    Years of education: GED   Tobacco Use    Smoking status: Former     Current packs/day: 0.00     Average packs/day: 1 pack/day for 16.0 years (16.0 ttl pk-yrs)     Types: Cigarettes     Start date: 1/10/2001     Quit date: 1/10/2017     Years since quittin.1    Smokeless tobacco: Never   Substance and Sexual Activity    Alcohol use: Yes     Alcohol/week: 1.0 standard drink of alcohol     Types: 1 Cans of beer per week     Comment: occasionally    Drug use: Yes     Types: Marijuana    Sexual activity: Yes     Partners: Male     Birth control/protection: Surgical, Post-menopausal, See Surgical Hx       Current Outpatient Medications   Medication Sig Dispense Refill    ARIPiprazole (ABILIFY) 5 MG Tab Take 5 mg by mouth once daily.  0    levetiracetam XR (KEPPRA XR) 500 mg Tb24 24 hr tablet 4 tablets at night for seizures. 120 tablet 11    multivit-minerals-ferrous fum (MULTI VITAMIN) 9 mg iron/15 mL Liqd Multi Vitamin   1 po daily      oxybutynin (DITROPAN-XL) 5 MG TR24 TAKE 1 TABLET BY MOUTH EVERY DAY 30 tablet 11    sertraline (ZOLOFT) 100 MG tablet Take 1 tablet (100 mg total) by mouth once daily. 30 tablet 11              0     No current facility-administered medications for this visit.        Review of patient's allergies indicates:   Allergen Reactions    Ace inhibitors      cough    Amoxicillin Nausea And Vomiting     diarrhea    Achromycin Rash           Review of Systems   Constitutional:  Positive for activity change (increased activity). Negative for appetite change and fever.   HENT:  Negative for sore throat.    Eyes:  Negative for visual disturbance.   Respiratory:  Negative for cough and shortness of breath.    Cardiovascular:  Negative for chest pain.   Gastrointestinal:  Negative for nausea and vomiting.   Endocrine: Negative for cold intolerance and heat intolerance.   Genitourinary:  Negative for difficulty urinating.   Musculoskeletal:  Positive for arthralgias  (improved) and neck pain (improved). Negative for back pain.   Skin:  Negative for rash.   Allergic/Immunologic: Negative for food allergies.   Neurological:  Negative for dizziness, tremors, seizures (no seizures since compliant with meds in past 8 months), speech difficulty, weakness, numbness and headaches.   Hematological:  Does not bruise/bleed easily.   Psychiatric/Behavioral:  Positive for dysphoric mood (improved). Negative for agitation, decreased concentration and sleep disturbance.        Objective:      Neurologic Exam     Cranial Nerves     CN III, IV, VI   Pupils are equal, round, and reactive to light.  Extraocular motions are normal.     Physical Exam  Vitals and nursing note reviewed.   Constitutional:       General: She is not in acute distress.     Appearance: She is well-developed. She is not diaphoretic.   HENT:      Head: Normocephalic and atraumatic.   Eyes:      General:         Right eye: No discharge.         Left eye: No discharge.      Extraocular Movements: EOM normal.      Conjunctiva/sclera: Conjunctivae normal.      Pupils: Pupils are equal, round, and reactive to light.   Neck:      Thyroid: No thyromegaly.   Cardiovascular:      Rate and Rhythm: Normal rate and regular rhythm.   Pulmonary:      Effort: Pulmonary effort is normal.      Breath sounds: Normal breath sounds.   Abdominal:      General: Bowel sounds are normal.      Palpations: Abdomen is soft.   Musculoskeletal:         General: No tenderness.      Cervical back: Normal range of motion and neck supple.   Skin:     General: Skin is warm and dry.   Neurological:      Cranial Nerves: No cranial nerve deficit.      Motor: No abnormal muscle tone.      Coordination: Coordination normal.      Deep Tendon Reflexes: Reflexes are normal and symmetric.   Psychiatric:         Behavior: Behavior normal.         Thought Content: Thought content normal.         Judgment: Judgment normal.         Assessment:       1. Seizure disorder         Plan:   Discussed risks and benefits of potential treatment options as well as potential side effects of medications.  Patient was counseled to continue current medications. Seizure precautions were discussed. Specifically discussed driving restrictions per [place] law. Medication compliance discussed. Instructed to call clinic if concerned or to ED if emergency.    Seizure disorder  She will continue Keppra XR 3 gm nightly.  She has done very well on monotherapy  Discussed compliance and 'catch up' doses if missed.  If breakthrough with out missed dose, I will add second AED.  Otherwise, follow up in one year.      MEJIA Fontenot

## 2025-07-09 ENCOUNTER — TELEPHONE (OUTPATIENT)
Dept: ORTHOPEDICS | Facility: CLINIC | Age: 65
End: 2025-07-09
Payer: MEDICAID

## 2025-07-09 DIAGNOSIS — R52 PAIN: Primary | ICD-10-CM

## 2025-07-09 NOTE — TELEPHONE ENCOUNTER
Contacted pt.  She is scheduled for next available on 09/09/2025.  Pt aware to report to xray before appt.

## 2025-07-09 NOTE — TELEPHONE ENCOUNTER
----- Message from Concepc[place] sent at 2025  9:36 AM CDT -----  Contact: pt  Ina Adair  MRN: [id number]  : 1960  PCP: Harpreet Harley  Home Phone      [phone]  Work Phone      Not on file.  Mobile          [phone]      MESSAGE:r Pt is checking on a referral  Dr. Harpreet Harley would have sent last week. Pt states it for her left shoulder. Pt also states she is having some trouble with her right knee. Pt is requesting a call back to schedule an appt.    [phone]